# Patient Record
Sex: MALE | Race: WHITE | Employment: FULL TIME | URBAN - METROPOLITAN AREA
[De-identification: names, ages, dates, MRNs, and addresses within clinical notes are randomized per-mention and may not be internally consistent; named-entity substitution may affect disease eponyms.]

---

## 2024-05-17 ENCOUNTER — HOSPITAL ENCOUNTER (EMERGENCY)
Age: 44
Discharge: HOME | End: 2024-05-17
Attending: EMERGENCY MEDICINE

## 2024-05-17 ENCOUNTER — APPOINTMENT (OUTPATIENT)
Dept: CT IMAGING | Age: 44
End: 2024-05-17

## 2024-05-17 VITALS
SYSTOLIC BLOOD PRESSURE: 125 MMHG | OXYGEN SATURATION: 96 % | TEMPERATURE: 98 F | DIASTOLIC BLOOD PRESSURE: 81 MMHG | HEART RATE: 97 BPM | BODY MASS INDEX: 20.32 KG/M2 | WEIGHT: 150 LBS | HEIGHT: 72 IN | RESPIRATION RATE: 16 BRPM

## 2024-05-17 DIAGNOSIS — R55 SYNCOPE AND COLLAPSE: Primary | ICD-10-CM

## 2024-05-17 LAB
ALBUMIN SERPL-MCNC: 3.7 G/DL (ref 3.5–4.6)
ALP SERPL-CCNC: 92 U/L (ref 35–104)
ALT SERPL-CCNC: 57 U/L (ref 0–41)
ANION GAP SERPL CALCULATED.3IONS-SCNC: 12 MEQ/L (ref 9–15)
AST SERPL-CCNC: 41 U/L (ref 0–40)
BASOPHILS # BLD: 0 K/UL (ref 0–0.2)
BASOPHILS NFR BLD: 0.2 %
BILIRUB SERPL-MCNC: 0.3 MG/DL (ref 0.2–0.7)
BUN SERPL-MCNC: 12 MG/DL (ref 6–20)
CALCIUM SERPL-MCNC: 8.8 MG/DL (ref 8.5–9.9)
CHLORIDE SERPL-SCNC: 102 MEQ/L (ref 95–107)
CO2 SERPL-SCNC: 25 MEQ/L (ref 20–31)
CREAT SERPL-MCNC: 0.61 MG/DL (ref 0.7–1.2)
D DIMER PPP FEU-MCNC: 0.4 MG/L FEU (ref 0–0.5)
EKG ATRIAL RATE: 61 BPM
EKG P AXIS: 73 DEGREES
EKG P-R INTERVAL: 178 MS
EKG Q-T INTERVAL: 474 MS
EKG QRS DURATION: 86 MS
EKG QTC CALCULATION (BAZETT): 477 MS
EKG R AXIS: -10 DEGREES
EKG T AXIS: 76 DEGREES
EKG VENTRICULAR RATE: 61 BPM
EOSINOPHIL # BLD: 0.2 K/UL (ref 0–0.7)
EOSINOPHIL NFR BLD: 5.3 %
ERYTHROCYTE [DISTWIDTH] IN BLOOD BY AUTOMATED COUNT: 12.1 % (ref 11.5–14.5)
GLOBULIN SER CALC-MCNC: 3.5 G/DL (ref 2.3–3.5)
GLUCOSE SERPL-MCNC: 146 MG/DL (ref 70–99)
HCT VFR BLD AUTO: 35.8 % (ref 42–52)
HGB BLD-MCNC: 11.9 G/DL (ref 14–18)
LYMPHOCYTES # BLD: 1.8 K/UL (ref 1–4.8)
LYMPHOCYTES NFR BLD: 43.6 %
MCH RBC QN AUTO: 28.3 PG (ref 27–31.3)
MCHC RBC AUTO-ENTMCNC: 33.2 % (ref 33–37)
MCV RBC AUTO: 85.2 FL (ref 79–92.2)
MONOCYTES # BLD: 0.3 K/UL (ref 0.2–0.8)
MONOCYTES NFR BLD: 7.4 %
NEUTROPHILS # BLD: 1.8 K/UL (ref 1.4–6.5)
NEUTS SEG NFR BLD: 43.3 %
PLATELET # BLD AUTO: 262 K/UL (ref 130–400)
POTASSIUM SERPL-SCNC: 3.5 MEQ/L (ref 3.4–4.9)
PROLACTIN SERPL-MCNC: 8.8 NG/ML (ref 4–15.2)
PROT SERPL-MCNC: 7.2 G/DL (ref 6.3–8)
RBC # BLD AUTO: 4.2 M/UL (ref 4.7–6.1)
SODIUM SERPL-SCNC: 139 MEQ/L (ref 135–144)
WBC # BLD AUTO: 4.2 K/UL (ref 4.8–10.8)

## 2024-05-17 PROCEDURE — 70450 CT HEAD/BRAIN W/O DYE: CPT

## 2024-05-17 PROCEDURE — 80053 COMPREHEN METABOLIC PANEL: CPT

## 2024-05-17 PROCEDURE — 85025 COMPLETE CBC W/AUTO DIFF WBC: CPT

## 2024-05-17 PROCEDURE — 2580000003 HC RX 258: Performed by: EMERGENCY MEDICINE

## 2024-05-17 PROCEDURE — 99284 EMERGENCY DEPT VISIT MOD MDM: CPT

## 2024-05-17 PROCEDURE — 93005 ELECTROCARDIOGRAM TRACING: CPT | Performed by: EMERGENCY MEDICINE

## 2024-05-17 PROCEDURE — 85379 FIBRIN DEGRADATION QUANT: CPT

## 2024-05-17 PROCEDURE — 84146 ASSAY OF PROLACTIN: CPT

## 2024-05-17 RX ORDER — 0.9 % SODIUM CHLORIDE 0.9 %
1000 INTRAVENOUS SOLUTION INTRAVENOUS ONCE
Status: COMPLETED | OUTPATIENT
Start: 2024-05-17 | End: 2024-05-17

## 2024-05-17 RX ADMIN — SODIUM CHLORIDE 1000 ML: 9 INJECTION, SOLUTION INTRAVENOUS at 14:34

## 2024-05-17 ASSESSMENT — ENCOUNTER SYMPTOMS
SORE THROAT: 0
SHORTNESS OF BREATH: 0
ABDOMINAL DISTENTION: 0
CHEST TIGHTNESS: 0
VOMITING: 0
WHEEZING: 0
COUGH: 0
ABDOMINAL PAIN: 0
EYE DISCHARGE: 0

## 2024-05-17 ASSESSMENT — LIFESTYLE VARIABLES
HOW OFTEN DO YOU HAVE A DRINK CONTAINING ALCOHOL: NEVER
HOW MANY STANDARD DRINKS CONTAINING ALCOHOL DO YOU HAVE ON A TYPICAL DAY: PATIENT DOES NOT DRINK

## 2024-05-17 ASSESSMENT — PAIN - FUNCTIONAL ASSESSMENT
PAIN_FUNCTIONAL_ASSESSMENT: NONE - DENIES PAIN

## 2024-05-17 NOTE — ED NOTES
Orthostatic vital signs obt. Presented to Dr. Tomlin.  Pt a&ox4, skin w/d/pale, 0 sob, 0 pain, 0 distress, calm, cooperative.

## 2024-05-17 NOTE — ED PROVIDER NOTES
[facility] ED  eMERGENCY dEPARTMENT eNCOUnter      Pt Name: Mitesh Dunn  MRN: [id number]  Birthdate 1980  Date of evaluation: 5/17/2024  Provider: Surinder Tomlin MD    CHIEF COMPLAINT       Chief Complaint   Patient presents with    Loss of Consciousness     Per report pt had syncope episode at home, 0 fall         HISTORY OF PRESENT ILLNESS   (Location/Symptom, Timing/Onset,Context/Setting, Quality, Duration, Modifying Factors, Severity)  Note limiting factors.   Mitesh Dunn is a 44 y.o. male who presents to the emergency department for evaluation of a syncopal episode at home.  Patient states he wakes up early because he does not sleep long and this afternoon he was sitting on the couch got up to change the TV station and sit back down when he felt lightheaded apparently sat down and passed out for a minute.  He remembers hearing his girlfriend calling 911.  He states he passed out for he thinks a minute but she is not here for any history right now.  Patient admits to marijuana and heroin use but last use was last night none today.  He denies previous episodes.  He has no chronic medical problems.  No related fever or chills.  No abdominal pain.  He did not eat today but is not hungry    HPI    NursingNotes were reviewed.    REVIEW OF SYSTEMS    (2-9 systems for level 4, 10 or more for level 5)     Review of Systems   Constitutional:  Negative for chills and diaphoresis.   HENT:  Negative for congestion, ear pain, mouth sores and sore throat.    Eyes:  Negative for photophobia and discharge.   Respiratory:  Negative for cough, chest tightness, shortness of breath and wheezing.    Cardiovascular:  Negative for chest pain and palpitations.   Gastrointestinal:  Negative for abdominal distention, abdominal pain and vomiting.   Endocrine: Negative for cold intolerance.   Genitourinary:  Negative for difficulty urinating.   Musculoskeletal:  Negative for arthralgias.   Skin:  Negative for

## 2024-05-17 NOTE — ED NOTES
Pt a&ox4, skin w/d/pink, 0 distress, 0 pain, 0 sob, pt out from ed with steady gait, going home with girlfriend, 0 problems.

## 2024-05-17 NOTE — ED TRIAGE NOTES
Per report pt had syncope episode x 1 this am, per report pt was sitting on the couch when passed out for few minutes, on arrival to er pt a&ox4, skin w/d/pale, 0 pain, 0 distress, 0 sob, pt admits using heroin lst night snored it. Pt denies drinking alcohol. Pt calm, cooperative, 0 injuries reported, 0 pain.

## 2024-05-20 LAB
EKG ATRIAL RATE: 61 BPM
EKG P AXIS: 73 DEGREES
EKG P-R INTERVAL: 178 MS
EKG Q-T INTERVAL: 474 MS
EKG QRS DURATION: 86 MS
EKG QTC CALCULATION (BAZETT): 477 MS
EKG R AXIS: -10 DEGREES
EKG T AXIS: 76 DEGREES
EKG VENTRICULAR RATE: 61 BPM